# Patient Record
Sex: MALE | Race: WHITE
[De-identification: names, ages, dates, MRNs, and addresses within clinical notes are randomized per-mention and may not be internally consistent; named-entity substitution may affect disease eponyms.]

---

## 2018-12-08 ENCOUNTER — HOSPITAL ENCOUNTER (EMERGENCY)
Dept: HOSPITAL 25 - UCCORT | Age: 68
Discharge: HOME | End: 2018-12-08
Payer: COMMERCIAL

## 2018-12-08 VITALS — SYSTOLIC BLOOD PRESSURE: 134 MMHG | DIASTOLIC BLOOD PRESSURE: 86 MMHG
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
## 2018-12-08 DIAGNOSIS — S02.631A: Primary | ICD-10-CM

## 2018-12-08 DIAGNOSIS — S06.9X0A: ICD-10-CM
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
## 2018-12-08 DIAGNOSIS — S02.641A: ICD-10-CM

## 2018-12-08 PROCEDURE — 70330 X-RAY EXAM OF JAW JOINTS: CPT

## 2018-12-08 PROCEDURE — G0463 HOSPITAL OUTPT CLINIC VISIT: HCPCS

## 2018-12-08 PROCEDURE — 99212 OFFICE O/P EST SF 10 MIN: CPT

## 2018-12-08 PROCEDURE — 70486 CT MAXILLOFACIAL W/O DYE: CPT

## 2018-12-08 NOTE — UC
Minor Trauma HPI





- HPI Summary


HPI Summary: 


patient was hit in the right cheek (tmj) area last night while playing 

basketball. hurts to open and close jaw----no dental injury








- History of Current Complaint


Chief Complaint: UCGeneralIllness


Stated Complaint: RIGHT CHEEK INJURY


Time Seen by Provider: 12/08/18 11:26


Hx Obtained From: Patient


Onset/Duration: Sudden Onset, Lasting Days - 1, Still Present


Onset Of Pain: Immediate


Pain Intensity: 6


Pain Scale Used: 0-10 Numeric


Mechanism Of Injury: Direct Blow


Aggravating Factor(s): Movement


Alleviating Factor(s): Nothing


Associated Signs And Symptoms: Positive: Ecchymosis





- Allergies/Home Medications


Allergies/Adverse Reactions: 


 Allergies











Allergy/AdvReac Type Severity Reaction Status Date / Time


 


No Known Allergies Allergy   Verified 12/08/18 10:58











Home Medications: 


 Home Medications





Gabapentin CAP(*) [Neurontin 300 CAP(*)] 1 cap BID 12/08/18 [History Confirmed 

12/08/18]


Meloxicam 1 tab DAILY PRN 12/08/18 [History Confirmed 12/08/18]











PMH/Surg Hx/FS Hx/Imm Hx


Previously Healthy: No - back surgery last year


Endocrine History: Dyslipidemia


GI/ History: Other


Other GI/ History: bph





- Surgical History


Surgical History: Yes


Surgery Procedure, Year, and Place: back, R knee





- Family History


Known Family History: Positive: None





- Social History


Occupation: Employed Full-time


Lives: With Family


Alcohol Use: Occasionally


Substance Use Type: None


Smoking Status (MU): Never Smoked Tobacco





- Immunization History


Most Recent Tetanus Shot: UTD





Review of Systems


All Other Systems Reviewed And Are Negative: Yes


Constitutional: Positive: Negative


Skin: Positive: Negative


Eyes: Positive: Negative


ENT: Positive: Negative


Respiratory: Positive: Negative


Cardiovascular: Positive: Negative


Gastrointestinal: Positive: Negative


Genitourinary: Positive: Negative


Motor: Positive: Negative


Neurovascular: Positive: Negative


Musculoskeletal: Positive: Arthralgia - right tmj


Neurological: Positive: Negative


Psychological: Positive: Negative


Is Patient Immunocompromised?: No





Physical Exam


Triage Information Reviewed: Yes


Appearance: Well-Appearing, No Pain Distress, Well-Nourished


Vital Signs: 


 Initial Vital Signs











Temp  98.1 F   12/08/18 10:59


 


Pulse  78   12/08/18 10:59


 


Resp  16   12/08/18 10:59


 


BP  141/94   12/08/18 10:59


 


Pulse Ox  100   12/08/18 10:59











Vital Signs Reviewed: Yes


Eye Exam: Normal


Eyes: Positive: Conjunctiva Clear


ENT Exam: Normal


ENT: Positive: Normal ENT inspection, Hearing grossly normal, Pharynx normal, 

Uvula midline.  Negative: Nasal congestion, Trismus, Muffled voice, Hoarse voice

, Dental tenderness, Sinus tenderness


Dental Exam: Normal


Neck exam: Normal


Neck: Positive: Supple, Nontender, No Lymphadenopathy


Respiratory Exam: Normal


Respiratory: Positive: Chest non-tender, No respiratory distress, No accessory 

muscle use


Cardiovascular Exam: Normal


Cardiovascular: Positive: RRR, Pulses Normal, Brisk Capillary Refill


Musculoskeletal Exam: Normal


Musculoskeletal: Positive: Strength Intact, No Edema, ROM Limited @ - jaw 

limited


Neurological Exam: Normal


Neurological: Positive: Alert, Muscle Tone Normal


Psychological Exam: Normal


Skin Exam: Normal





Diagnostics





- Laboratory


Diagnostic Studies Completed/Ordered: x-ray negative, CT nondisplaced fracture 

right mandibular coronoid process adn ramas





Minor Trauma Course/Dx





- Course


Course Of Treatment: pat d/c to home with dx of contusion pending CT result non 

displaced fracture right mandibular coronoid process and ramus----patient 

called and dx and treament planned updates for follow up with Dr. Frias (after 

consultation) planned for Monday at 10 am-([facility] [place] no one on for facial trauma)





- Differential Dx/Diagnosis


Provider Diagnosis: 


 Mandibular body fracture








Discharge





- Sign-Out/Discharge


Documenting (check all that apply): Patient Departure


All imaging exams completed and their final reports reviewed: Yes





- Discharge Plan


Condition: Stable


Disposition: HOME


Patient Education Materials:  Ibuprofen (By mouth), Contusion in Adults (ED), 

Hypertension (ED), Ice Pack Application (ED)


Referrals: 


Olivier Novoa MD [Primary Care Provider] - 2 Weeks





- Billing Disposition and Condition


Condition: STABLE


Disposition: Home





- Attestation Statements


Provider Attestation: 





I was available for consult. This patient was seen by the L[facility]. The patient was 

not presented to, seen by, or examined by me. EK